# Patient Record
Sex: FEMALE | Race: WHITE | Employment: FULL TIME | URBAN - METROPOLITAN AREA
[De-identification: names, ages, dates, MRNs, and addresses within clinical notes are randomized per-mention and may not be internally consistent; named-entity substitution may affect disease eponyms.]

---

## 2017-02-24 DIAGNOSIS — F33.0 MAJOR DEPRESSIVE DISORDER, RECURRENT EPISODE, MILD (H): ICD-10-CM

## 2017-02-24 DIAGNOSIS — F90.0 ATTENTION DEFICIT HYPERACTIVITY DISORDER (ADHD), PREDOMINANTLY INATTENTIVE TYPE: ICD-10-CM

## 2017-02-24 RX ORDER — DEXTROAMPHETAMINE SACCHARATE, AMPHETAMINE ASPARTATE MONOHYDRATE, DEXTROAMPHETAMINE SULFATE AND AMPHETAMINE SULFATE 5; 5; 5; 5 MG/1; MG/1; MG/1; MG/1
20 CAPSULE, EXTENDED RELEASE ORAL DAILY
Qty: 30 CAPSULE | Refills: 0 | OUTPATIENT
Start: 2017-02-24

## 2017-02-24 RX ORDER — ESCITALOPRAM OXALATE 20 MG/1
20 TABLET ORAL DAILY
Qty: 90 TABLET | Refills: 0 | Status: SHIPPED | OUTPATIENT
Start: 2017-02-24 | End: 2017-03-06

## 2017-02-24 NOTE — TELEPHONE ENCOUNTER
..Reason for Call:  Medication or medication refill:    Do you use a [facility]?  Name of the pharmacy and phone number for the current request:  [facility] [phone]    Name of the medication requested: adderall, lexapro    Other request: just call when the script for the adderall is ready    Can we leave a detailed message on this number? YES    Phone number patient can be reached at: [phone] (D)      Best Time: anytime  Please delegate to another provider    Call taken on 2/24/2017 at 2:04 PM by Paula Portillo

## 2017-03-06 RX ORDER — ESCITALOPRAM OXALATE 20 MG/1
20 TABLET ORAL DAILY
Qty: 90 TABLET | Refills: 0 | Status: SHIPPED | OUTPATIENT
Start: 2017-03-06 | End: 2023-05-25

## 2017-03-06 RX ORDER — DEXTROAMPHETAMINE SACCHARATE, AMPHETAMINE ASPARTATE MONOHYDRATE, DEXTROAMPHETAMINE SULFATE AND AMPHETAMINE SULFATE 5; 5; 5; 5 MG/1; MG/1; MG/1; MG/1
20 CAPSULE, EXTENDED RELEASE ORAL DAILY
Qty: 30 CAPSULE | Refills: 0 | Status: SHIPPED | OUTPATIENT
Start: 2017-03-06 | End: 2023-05-25

## 2017-03-06 NOTE — TELEPHONE ENCOUNTER
Reason for Call:  Other prescription    Detailed comments: Pt returning phone call and would like for covering provider for Yadira Clyde to refill Rx's as soon as possible for Pt is leaving out of town this week.    Phone Number Patient can be reached at: Home number on file [phone] (home)    Best Time: Anytime    Can we leave a detailed message on this number? YES    Call taken on 3/6/2017 at 3:54 PM by Stefano Morales

## 2017-05-21 ENCOUNTER — TRANSFERRED RECORDS (OUTPATIENT)
Dept: HEALTH INFORMATION MANAGEMENT | Facility: CLINIC | Age: 52
End: 2017-05-21

## 2020-02-23 ENCOUNTER — HEALTH MAINTENANCE LETTER (OUTPATIENT)
Age: 55
End: 2020-02-23

## 2020-12-01 ENCOUNTER — OFFICE VISIT (OUTPATIENT)
Dept: DERMATOLOGY | Facility: CLINIC | Age: 55
End: 2020-12-01
Payer: COMMERCIAL

## 2020-12-01 DIAGNOSIS — D22.9 CONGENITAL MELANOCYTIC NEVUS: ICD-10-CM

## 2020-12-01 DIAGNOSIS — L82.0 INFLAMED SEBORRHEIC KERATOSIS: Primary | ICD-10-CM

## 2020-12-01 PROCEDURE — 99202 OFFICE O/P NEW SF 15 MIN: CPT | Mod: 25 | Performed by: DERMATOLOGY

## 2020-12-01 PROCEDURE — 17110 DESTRUCTION B9 LES UP TO 14: CPT | Performed by: DERMATOLOGY

## 2020-12-01 RX ORDER — LISDEXAMFETAMINE DIMESYLATE 40 MG/1
40 CAPSULE ORAL DAILY
COMMUNITY
Start: 2020-10-23

## 2020-12-01 ASSESSMENT — PAIN SCALES - GENERAL: PAINLEVEL: NO PAIN (0)

## 2020-12-01 NOTE — PROGRESS NOTES
[facility] Dermatology Note    Dermatology Problem List:  1. History of AKs  2. History of IPL treatments  3. Small melanocytic congenital nevus, left superior shoulder  4. ISK, left medial cheek: s/p cryo    Encounter Date: Dec 1, 2020    CC:  Chief Complaint   Patient presents with     Derm Problem     Lesion on left side of nose x6 months, has increased in size. Mole on left shoulder, checked by previous dermatologist and was not concerned.       History of Present Illness:  Ms. Adair Velez is a 55 year old female who presents for lesion of concern.    She is new to our department. She has seen a dermatologist prior. She has no history of skin cancer, atypical moles, or precancerous lesions to her knowledge.    Today, she notes concerns about a lesion on the left side of her nose. It has been there about 6 months. Increasing in size. Picked at it some. Hurts some. Has bled a bit when picked at. She also notes concerns about a birth bill on her left shoulder. No changes recently but would like re-evaluate to ensure still ok.    No other concerns addressed today.    Past Medical History:   Patient Active Problem List   Diagnosis     CARDIOVASCULAR SCREENING; LDL GOAL LESS THAN 160     Abnormal Pap smear     Anxiety     Mild major depression (H)     Attention deficit hyperactivity disorder (ADHD), predominantly inattentive type     Controlled substance agreement signed     Past Medical History:   Diagnosis Date     Abnormal Pap smear     + HPV, HGSIL     Degeneration of lumbar or lumbosacral intervertebral disc 2008     Depression with anxiety      MEDICAL HISTORY OF -     NEUROCARDIOGENIC ASYNCOPE     Past Surgical History:   Procedure Laterality Date     ABDOMINOPLASTY  14     ARTHROSCOPY KNEE  1993     BREAST SURGERY  14     C STOMACH SURGERY PROCEDURE UNLISTED        SECTION  2009     LEEP TX, CERVICAL      LEEP TX Cervical     TONSILLECTOMY      AGE [age]        Social History:  Patient reports that she has never smoked. She has never used smokeless tobacco. She reports current alcohol use. She reports that she does not use drugs. 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    Family History:  Negative for skin cancer.    Medications:  Current Outpatient Medications   Medication Sig Dispense Refill     busPIRone (BUSPAR) 5 MG tablet Start at 5 mg twice daily for 3 days, then 7.5 mg (1.5 tabs) twice daily for 3 days, then 10 mg (2 tabs) twice daily for 3 days, then 12.5 mg (2.5 tabs) twice daily for 3 days, then 15 mg (3 tabs) twice daily and stay at that dose 150 tablet 0     escitalopram (LEXAPRO) 20 MG tablet Take 1 tablet (20 mg) by mouth daily 90 tablet 0     TYLENOL 325 MG OR TABS 2 TABLETS EVERY 4 HOURS AS NEEDED       VYVANSE 40 MG capsule Take 40 mg by mouth daily       amphetamine-dextroamphetamine (ADDERALL XR) 20 MG per 24 hr capsule Take 1 capsule (20 mg) by mouth daily (Patient not taking: Reported on 12/1/2020) 30 capsule 0     amphetamine-dextroamphetamine (ADDERALL XR) 20 MG per capsule Take 1 capsule (20 mg) by mouth daily (Patient not taking: Reported on 12/1/2020) 30 capsule 0     amphetamine-dextroamphetamine (ADDERALL XR) 20 MG per capsule Take 1 capsule (20 mg) by mouth daily (Patient not taking: Reported on 12/1/2020) 30 capsule 0       Allergies   Allergen Reactions     No Known Drug Allergy        Review of Systems:  -Constitutional: Patient is otherwise feeling well, in usual state of health.   -Skin: As above in HPI. No additional skin concerns.    Physical exam:  Vitals: There were no vitals taken for this visit.  GEN: This is a well developed, well-nourished female in no acute distress, in a pleasant mood.    SKIN: Focused examination of the face (not under mask) and left shoulder was performed.  - Espinoza Type II  - ~4x2cm brown plaque with speckled appearance and terminal hairs on the left superior  shoulder  - Inflamed seborrheic keratosis on the left medial cheek  - No other lesions of concern on areas examined.     Impression/Plan:    1. Seborrheic keratosis, inflamed: reassured of benign nature. Clinically inflamed and symptomatic, so treatment warranted. Discussed cryo vs shave removal. Recommended cryo as has the potential to not scar. Cryo make take multiple treatments to resolve and may have hypopigmentation but scar is rare. Patient agreeable to this.  - Cryotherapy procedure note: After verbal consent and discussion of risks and benefits including but no limited to dyspigmentation/scar, blister, and pain, 1 was(were) treated with 1-2mm freeze border for 2 cycles with liquid nitrogen. Post cryotherapy instructions were provided.    2. Small congenital melanocytic nevus, no atypia: Reassured of benign appearance and only very low risk of malignant transformation.    Follow-up in 2021 for skin check, earlier for new or changing lesions.     Staff Involved:  Staff and scribe    Scribe Disclosure  I, Sabine García, am serving as a scribe to document services personally performed by Dr. Flor Mas MD, based on data collection and the provider's statements to me.    Provider Disclosure:   The documentation recorded by the scribe accurately reflects the services I personally performed and the decisions made by me.    Flor Mas MD    Department of Dermatology  [facility]: Phone: [phone], Fax:[phone]  [facility]: Phone: [phone], Fax: [phone]

## 2020-12-01 NOTE — PROGRESS NOTES
Adair Velez's goals for this visit include:   Chief Complaint   Patient presents with     Derm Problem     Lesion on left side of nose x6 months, has increased in size. Mole on left shoulder, checked by previous dermatologist and was not concerned.       She requests these members of her care team be copied on today's visit information: no    PCP: Bakari Jenkins    Referring Provider:  No referring provider defined for this encounter.    There were no vitals taken for this visit.    Do you need any medication refills at today's visit? No  Sabine García LPN

## 2020-12-01 NOTE — PATIENT INSTRUCTIONS
Cryotherapy    What is it?    Use of a very cold liquid, such as liquid nitrogen, to freeze and destroy abnormal skin cells that need to be removed    What should I expect?    Tenderness and redness    A small blister that might grow and fill with dark purple blood. There may be crusting.    More than one treatment may be needed if the lesions do not go away.    How do I care for the treated area?    Gently wash the area with your hands when bathing.    Use a thin layer of Vaseline to help with healing. You may use a Band-Aid.     The area should heal within 7-10 days and may leave behind a pink or lighter color.     Do not use an antibiotic or Neosporin ointment.     You may take acetaminophen (Tylenol) for pain.     Call your Doctor if you have:    Severe pain    Signs of infection (warmth, redness, cloudy yellow drainage, and or a bad smell)    Questions or concerns    Who should I call with questions?       [facility]: [phone]       [facility]: [phone]       For urgent needs outside of business hours call the [facility] at [phone]        and ask for the dermatology resident on call

## 2020-12-01 NOTE — LETTER
12/1/2020         RE: Adair Velez  [address]  [place] [address]        Dear Colleague,    Thank you for referring your patient, Adair Velez, to the [facility]. Please see a copy of my visit note below.    [facility] Dermatology Note    Dermatology Problem List:  1. History of AKs  2. History of IPL treatments  3. Small melanocytic congenital nevus, left superior shoulder  4. ISK, left medial cheek: s/p cryo    Encounter Date: Dec 1, 2020    CC:  Chief Complaint   Patient presents with     Derm Problem     Lesion on left side of nose x6 months, has increased in size. Mole on left shoulder, checked by previous dermatologist and was not concerned.       History of Present Illness:  Ms. Adair Velez is a 55 year old female who presents for lesion of concern.    She is new to our department. She has seen a dermatologist prior. She has no history of skin cancer, atypical moles, or precancerous lesions to her knowledge.    Today, she notes concerns about a lesion on the left side of her nose. It has been there about 6 months. Increasing in size. Picked at it some. Hurts some. Has bled a bit when picked at. She also notes concerns about a birth bill on her left shoulder. No changes recently but would like re-evaluate to ensure still ok.    No other concerns addressed today.    Past Medical History:   Patient Active Problem List   Diagnosis     CARDIOVASCULAR SCREENING; LDL GOAL LESS THAN 160     Abnormal Pap smear     Anxiety     Mild major depression (H)     Attention deficit hyperactivity disorder (ADHD), predominantly inattentive type     Controlled substance agreement signed     Past Medical History:   Diagnosis Date     Abnormal Pap smear 1999    + HPV, HGSIL     Degeneration of lumbar or lumbosacral intervertebral disc 4/23/2008     Depression with anxiety      MEDICAL HISTORY OF -     NEUROCARDIOGENIC ASYNCOPE     Past Surgical History:    Procedure Laterality Date     ABDOMINOPLASTY  14     ARTHROSCOPY KNEE  1993     BREAST SURGERY  14     C STOMACH SURGERY PROCEDURE UNLISTED        SECTION  2009     LEEP TX, CERVICAL      LEEP TX Cervical     TONSILLECTOMY      AGE 8-9       Social History:  Patient reports that she has never smoked. She has never used smokeless tobacco. She reports current alcohol use. She reports that she does not use drugs. 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    Family History:  Negative for skin cancer.    Medications:  Current Outpatient Medications   Medication Sig Dispense Refill     busPIRone (BUSPAR) 5 MG tablet Start at 5 mg twice daily for 3 days, then 7.5 mg (1.5 tabs) twice daily for 3 days, then 10 mg (2 tabs) twice daily for 3 days, then 12.5 mg (2.5 tabs) twice daily for 3 days, then 15 mg (3 tabs) twice daily and stay at that dose 150 tablet 0     escitalopram (LEXAPRO) 20 MG tablet Take 1 tablet (20 mg) by mouth daily 90 tablet 0     TYLENOL 325 MG OR TABS 2 TABLETS EVERY 4 HOURS AS NEEDED       VYVANSE 40 MG capsule Take 40 mg by mouth daily       amphetamine-dextroamphetamine (ADDERALL XR) 20 MG per 24 hr capsule Take 1 capsule (20 mg) by mouth daily (Patient not taking: Reported on 2020) 30 capsule 0     amphetamine-dextroamphetamine (ADDERALL XR) 20 MG per capsule Take 1 capsule (20 mg) by mouth daily (Patient not taking: Reported on 2020) 30 capsule 0     amphetamine-dextroamphetamine (ADDERALL XR) 20 MG per capsule Take 1 capsule (20 mg) by mouth daily (Patient not taking: Reported on 2020) 30 capsule 0       Allergies   Allergen Reactions     No Known Drug Allergy        Review of Systems:  -Constitutional: Patient is otherwise feeling well, in usual state of health.   -Skin: As above in HPI. No additional skin concerns.    Physical exam:  Vitals: There were no vitals taken for this visit.  GEN: This is a well developed,  well-nourished female in no acute distress, in a pleasant mood.    SKIN: Focused examination of the face (not under mask) and left shoulder was performed.  - Espinoza Type II  - ~4x2cm brown plaque with speckled appearance and terminal hairs on the left superior shoulder  - Inflamed seborrheic keratosis on the left medial cheek  - No other lesions of concern on areas examined.     Impression/Plan:    1. Seborrheic keratosis, inflamed: reassured of benign nature. Clinically inflamed and symptomatic, so treatment warranted. Discussed cryo vs shave removal. Recommended cryo as has the potential to not scar. Cryo make take multiple treatments to resolve and may have hypopigmentation but scar is rare. Patient agreeable to this.  - Cryotherapy procedure note: After verbal consent and discussion of risks and benefits including but no limited to dyspigmentation/scar, blister, and pain, 1 was(were) treated with 1-2mm freeze border for 2 cycles with liquid nitrogen. Post cryotherapy instructions were provided.    2. Small congenital melanocytic nevus, no atypia: Reassured of benign appearance and only very low risk of malignant transformation.    Follow-up in 2021 for skin check, earlier for new or changing lesions.     Staff Involved:  Staff and scribe    Scribe Disclosure  I, Sabine García, am serving as a scribe to document services personally performed by Dr. Flor Mas MD, based on data collection and the provider's statements to me.    Provider Disclosure:   The documentation recorded by the scribe accurately reflects the services I personally performed and the decisions made by me.    Flor Mas MD    Department of Dermatology  [facility]: Phone: [phone], Fax:[phone]  [facility]: Phone: [phone], Fax: [phone]          Adair Velez's goals for this visit include:    Chief Complaint   Patient presents with     Derm Problem     Lesion on left side of nose x6 months, has increased in size. Mole on left shoulder, checked by previous dermatologist and was not concerned.       She requests these members of her care team be copied on today's visit information: no    PCP: Bakari Jenkins    Referring Provider:  No referring provider defined for this encounter.    There were no vitals taken for this visit.    Do you need any medication refills at today's visit? No  Sabine García LPN          Again, thank you for allowing me to participate in the care of your patient.        Sincerely,        Flor Mas MD

## 2021-04-11 ENCOUNTER — HEALTH MAINTENANCE LETTER (OUTPATIENT)
Age: 56
End: 2021-04-11

## 2021-09-25 ENCOUNTER — HEALTH MAINTENANCE LETTER (OUTPATIENT)
Age: 56
End: 2021-09-25

## 2021-10-18 ENCOUNTER — E-VISIT (OUTPATIENT)
Dept: URGENT CARE | Facility: URGENT CARE | Age: 56
End: 2021-10-18
Payer: COMMERCIAL

## 2021-10-18 DIAGNOSIS — J01.90 ACUTE SINUSITIS, RECURRENCE NOT SPECIFIED, UNSPECIFIED LOCATION: Primary | ICD-10-CM

## 2021-10-18 PROCEDURE — 99421 OL DIG E/M SVC 5-10 MIN: CPT | Performed by: FAMILY MEDICINE

## 2021-10-18 RX ORDER — FLUTICASONE PROPIONATE 50 MCG
1 SPRAY, SUSPENSION (ML) NASAL DAILY
Qty: 18.2 ML | Refills: 0 | Status: SHIPPED | OUTPATIENT
Start: 2021-10-18

## 2021-10-18 NOTE — PATIENT INSTRUCTIONS
Dear Adair Velez    After reviewing your responses, I've been able to diagnose you with?a sinus infection caused by a virus.?     Based on your responses and diagnosis, I have prescribed Flonase to treat your symptoms. I have sent this to your pharmacy.?     It is also important to stay well hydrated, get lots of rest and take over-the-counter decongestants,?tylenol?or ibuprofen if you?are able to?take those medications per your primary care provider to help relieve discomfort.?     It is important that you take?all of?your prescribed medication even if your symptoms are improving after a few doses.? Taking?all of?your medicine helps prevent the symptoms from returning.?     If your symptoms worsen, you develop severe headache, vomiting, high fever (>102), or are not improving in 7 days, please contact your primary care provider for an appointment or visit any of our convenient Walk-in Care or Urgent Care Centers to be seen which can be found on our website?here.?     Thanks again for choosing?us?as your health care partner,?   ?  Bakari Carrasco, DO?

## 2022-05-07 ENCOUNTER — HEALTH MAINTENANCE LETTER (OUTPATIENT)
Age: 57
End: 2022-05-07

## 2022-05-13 ENCOUNTER — OFFICE VISIT (OUTPATIENT)
Dept: URGENT CARE | Facility: URGENT CARE | Age: 57
End: 2022-05-13
Payer: COMMERCIAL

## 2022-05-13 VITALS
OXYGEN SATURATION: 99 % | SYSTOLIC BLOOD PRESSURE: 126 MMHG | DIASTOLIC BLOOD PRESSURE: 78 MMHG | HEART RATE: 80 BPM | WEIGHT: 155 LBS | TEMPERATURE: 99 F | RESPIRATION RATE: 16 BRPM | BODY MASS INDEX: 24.83 KG/M2

## 2022-05-13 DIAGNOSIS — R07.0 THROAT PAIN: ICD-10-CM

## 2022-05-13 DIAGNOSIS — Z20.822 SUSPECTED 2019 NOVEL CORONAVIRUS INFECTION: ICD-10-CM

## 2022-05-13 DIAGNOSIS — J01.90 ACUTE SINUSITIS WITH SYMPTOMS > 10 DAYS: Primary | ICD-10-CM

## 2022-05-13 LAB — DEPRECATED S PYO AG THROAT QL EIA: NEGATIVE

## 2022-05-13 PROCEDURE — 87651 STREP A DNA AMP PROBE: CPT | Performed by: PHYSICIAN ASSISTANT

## 2022-05-13 PROCEDURE — U0003 INFECTIOUS AGENT DETECTION BY NUCLEIC ACID (DNA OR RNA); SEVERE ACUTE RESPIRATORY SYNDROME CORONAVIRUS 2 (SARS-COV-2) (CORONAVIRUS DISEASE [COVID-19]), AMPLIFIED PROBE TECHNIQUE, MAKING USE OF HIGH THROUGHPUT TECHNOLOGIES AS DESCRIBED BY CMS-2020-01-R: HCPCS | Performed by: PHYSICIAN ASSISTANT

## 2022-05-13 PROCEDURE — U0005 INFEC AGEN DETEC AMPLI PROBE: HCPCS | Performed by: PHYSICIAN ASSISTANT

## 2022-05-13 PROCEDURE — 99213 OFFICE O/P EST LOW 20 MIN: CPT | Mod: CS | Performed by: PHYSICIAN ASSISTANT

## 2022-05-13 RX ORDER — SERTRALINE HYDROCHLORIDE 100 MG/1
200 TABLET, FILM COATED ORAL DAILY
COMMUNITY
Start: 2022-04-22

## 2022-05-13 ASSESSMENT — ENCOUNTER SYMPTOMS
MYALGIAS: 0
JOINT SWELLING: 0
NAUSEA: 0
ALLERGIC/IMMUNOLOGIC NEGATIVE: 1
VOMITING: 0
FEVER: 1
CARDIOVASCULAR NEGATIVE: 1
WOUND: 0
DIARRHEA: 0
SINUS PAIN: 1
EYES NEGATIVE: 1
RHINORRHEA: 0
WEAKNESS: 0
COUGH: 1
ARTHRALGIAS: 0
PALPITATIONS: 0
SHORTNESS OF BREATH: 0
LIGHT-HEADEDNESS: 0
ENDOCRINE NEGATIVE: 1
HEADACHES: 0
SINUS PRESSURE: 1
MUSCULOSKELETAL NEGATIVE: 1
DIZZINESS: 0
NECK PAIN: 0
NECK STIFFNESS: 0
CHILLS: 0
BACK PAIN: 0
SORE THROAT: 1

## 2022-05-13 NOTE — PROGRESS NOTES
Chief Complaint:     Chief Complaint   Patient presents with     Pharyngitis     Started yesterday with sore throat and coughing with sinus congestion and headache.        Results for orders placed or performed in visit on 05/13/22   Streptococcus A Rapid Screen w/Reflex to PCR     Status: Normal    Specimen: Throat; Swab   Result Value Ref Range    Group A Strep antigen Negative Negative       Medical Decision Making:    Vital signs reviewed by Mike Chavez PA-C  /78 (BP Location: Right arm, Patient Position: Sitting, Cuff Size: Adult Regular)   Pulse 80   Temp 99  F (37.2  C)   Resp 16   Wt 70.3 kg (155 lb)   SpO2 99%   BMI 24.83 kg/m      Differential Diagnosis:  URI Adult/Peds:  Bronchitis-viral, Influenza, Pneumonia, Sinusitis, Strep pharyngitis, Tonsilitis, Viral pharyngitis, Viral syndrome and Viral upper respiratory illness        ASSESSMENT    1. Acute sinusitis with symptoms > 10 days    2. Throat pain    3. Suspected 2019 novel coronavirus infection        PLAN    Patient is in no acute distress.    Temp is 99 in clinic today, lung sounds were clear, and O2 sats at 99% on RA.    RST was negative.  We will call with PCR results only if positive.  COVID swab collected in clinic today.  With length of symptoms, Rx for Augmentin sent in for sinus infection.  Rest, Push fluids, vaporizer, elevation of head of bed.  Ibuprofen and or Tylenol for any fever or body aches.  Over the counter cough suppressant- PRN- as discussed.   If symptoms worsen, recheck immediately otherwise follow up with your PCP in 1 week if symptoms are not improving.  Worrisome symptoms discussed with instructions to go to the ED.  Patient verbalized understanding and agreed with this plan.    Labs:    Results for orders placed or performed in visit on 05/13/22   Streptococcus A Rapid Screen w/Reflex to PCR     Status: Normal    Specimen: Throat; Swab   Result Value Ref Range    Group A Strep antigen Negative Negative         Vital signs reviewed by Mike Chavez PA-C  /78 (BP Location: Right arm, Patient Position: Sitting, Cuff Size: Adult Regular)   Pulse 80   Temp 99  F (37.2  C)   Resp 16   Wt 70.3 kg (155 lb)   SpO2 99%   BMI 24.83 kg/m      Current Meds      Current Outpatient Medications:      amoxicillin-clavulanate (AUGMENTIN) 875-125 MG tablet, Take 1 tablet by mouth 2 times daily for 10 days, Disp: 20 tablet, Rfl: 0     fluticasone (FLONASE) 50 MCG/ACT nasal spray, Spray 1 spray into both nostrils daily, Disp: 18.2 mL, Rfl: 0     sertraline (ZOLOFT) 100 MG tablet, Take 200 mg by mouth daily, Disp: , Rfl:      TYLENOL 325 MG OR TABS, 2 TABLETS EVERY 4 HOURS AS NEEDED, Disp: , Rfl:      VYVANSE 40 MG capsule, Take 40 mg by mouth daily, Disp: , Rfl:      amphetamine-dextroamphetamine (ADDERALL XR) 20 MG per 24 hr capsule, Take 1 capsule (20 mg) by mouth daily (Patient not taking: Reported on 12/1/2020), Disp: 30 capsule, Rfl: 0     amphetamine-dextroamphetamine (ADDERALL XR) 20 MG per capsule, Take 1 capsule (20 mg) by mouth daily (Patient not taking: Reported on 12/1/2020), Disp: 30 capsule, Rfl: 0     amphetamine-dextroamphetamine (ADDERALL XR) 20 MG per capsule, Take 1 capsule (20 mg) by mouth daily (Patient not taking: Reported on 12/1/2020), Disp: 30 capsule, Rfl: 0     busPIRone (BUSPAR) 5 MG tablet, Start at 5 mg twice daily for 3 days, then 7.5 mg (1.5 tabs) twice daily for 3 days, then 10 mg (2 tabs) twice daily for 3 days, then 12.5 mg (2.5 tabs) twice daily for 3 days, then 15 mg (3 tabs) twice daily and stay at that dose, Disp: 150 tablet, Rfl: 0     escitalopram (LEXAPRO) 20 MG tablet, Take 1 tablet (20 mg) by mouth daily, Disp: 90 tablet, Rfl: 0      Respiratory History    occasional episodes of bronchitis      SUBJECTIVE    HPI: Adair Velez is an 56 year old female who presents with chest congestion, cough nonproductive, occasional, fever, nasal congestion, nasal discharge clear and sore  throat.  Symptoms began 10  days ago and has worsened.  There is no shortness of breath, wheezing and chest pain.  Patient is eating and drinking well.  No nausea, vomiting, or diarrhea.    Patient denies any recent travel or exposure to known COVID positive tested individual.      ROS:     Review of Systems   Constitutional: Positive for fever. Negative for chills.   HENT: Positive for congestion, sinus pressure, sinus pain and sore throat. Negative for ear pain and rhinorrhea.    Eyes: Negative.    Respiratory: Positive for cough. Negative for shortness of breath.    Cardiovascular: Negative.  Negative for chest pain and palpitations.   Gastrointestinal: Negative for diarrhea, nausea and vomiting.   Endocrine: Negative.    Genitourinary: Negative.    Musculoskeletal: Negative.  Negative for arthralgias, back pain, joint swelling, myalgias, neck pain and neck stiffness.   Skin: Negative.  Negative for rash and wound.   Allergic/Immunologic: Negative.  Negative for immunocompromised state.   Neurological: Negative for dizziness, weakness, light-headedness and headaches.         Family History   Family History   Problem Relation Age of Onset     Cancer - colorectal Mother      Heart Disease Mother      Prostate Cancer Father         Problem history  Patient Active Problem List   Diagnosis     CARDIOVASCULAR SCREENING; LDL GOAL LESS THAN 160     Abnormal Pap smear     Anxiety     Mild major depression (H)     Attention deficit hyperactivity disorder (ADHD), predominantly inattentive type     Controlled substance agreement signed        Allergies  Allergies   Allergen Reactions     No Known Drug Allergy         Social History  Social History     Socioeconomic History     Marital status:      Spouse name: Not on file     Number of children: Not on file     Years of education: Not on file     Highest education level: Not on file   Occupational History     Not on file   Tobacco Use     Smoking status: Never Smoker      Smokeless tobacco: Never Used   Substance and Sexual Activity     Alcohol use: Yes     Comment: socially     Drug use: No     Sexual activity: Never   Other Topics Concern     Parent/sibling w/ CABG, MI or angioplasty before 65F 55M? Not Asked      Service No     Blood Transfusions No     Caffeine Concern No     Occupational Exposure No     Hobby Hazards No     Sleep Concern No     Stress Concern No     Weight Concern Yes     Special Diet No     Back Care No     Exercise No     Bike Helmet No     Seat Belt Yes     Self-Exams No   Social History Narrative     Not on file     Social Determinants of Health     Financial Resource Strain: Not on file   Food Insecurity: Not on file   Transportation Needs: Not on file   Physical Activity: Not on file   Stress: Not on file   Social Connections: Not on file   Intimate Partner Violence: Not on file   Housing Stability: Not on file        OBJECTIVE     Vital signs reviewed by Mike Chavez PA-C  /78 (BP Location: Right arm, Patient Position: Sitting, Cuff Size: Adult Regular)   Pulse 80   Temp 99  F (37.2  C)   Resp 16   Wt 70.3 kg (155 lb)   SpO2 99%   BMI 24.83 kg/m       Physical Exam  Vitals and nursing note reviewed.   Constitutional:       General: She is not in acute distress.     Appearance: She is well-developed. She is not ill-appearing, toxic-appearing or diaphoretic.   HENT:      Head: Normocephalic and atraumatic.      Right Ear: Hearing, tympanic membrane, ear canal and external ear normal. Tympanic membrane is not perforated, erythematous, retracted or bulging.      Left Ear: Hearing, tympanic membrane, ear canal and external ear normal. Tympanic membrane is not perforated, erythematous, retracted or bulging.      Nose: Congestion present. No mucosal edema or rhinorrhea.      Right Sinus: Maxillary sinus tenderness present. No frontal sinus tenderness.      Left Sinus: Maxillary sinus tenderness present. No frontal sinus tenderness.       Mouth/Throat:      Pharynx: Posterior oropharyngeal erythema present. No pharyngeal swelling, oropharyngeal exudate or uvula swelling.      Tonsils: No tonsillar exudate or tonsillar abscesses. 0 on the right. 0 on the left.   Eyes:      General:         Right eye: No discharge.         Left eye: No discharge.      Pupils: Pupils are equal, round, and reactive to light.   Cardiovascular:      Rate and Rhythm: Normal rate and regular rhythm.      Heart sounds: Normal heart sounds. No murmur heard.    No friction rub. No gallop.   Pulmonary:      Effort: Pulmonary effort is normal. No respiratory distress.      Breath sounds: Normal breath sounds. No decreased breath sounds, wheezing, rhonchi or rales.   Chest:      Chest wall: No tenderness.   Abdominal:      General: Bowel sounds are normal. There is no distension.      Palpations: Abdomen is soft. There is no mass.      Tenderness: There is no abdominal tenderness. There is no guarding.   Musculoskeletal:      Cervical back: Normal range of motion and neck supple.   Lymphadenopathy:      Head:      Right side of head: No submental, submandibular, tonsillar, preauricular or posterior auricular adenopathy.      Left side of head: No submental, submandibular, tonsillar, preauricular or posterior auricular adenopathy.      Cervical: No cervical adenopathy.      Right cervical: No superficial or posterior cervical adenopathy.     Left cervical: No superficial or posterior cervical adenopathy.   Skin:     General: Skin is warm and dry.      Findings: No rash.   Neurological:      Mental Status: She is alert and oriented to person, place, and time.      Cranial Nerves: No cranial nerve deficit.      Deep Tendon Reflexes: Reflexes are normal and symmetric.   Psychiatric:         Behavior: Behavior normal. Behavior is cooperative.         Thought Content: Thought content normal.         Judgment: Judgment normal.           Mike Chavez PA-C  5/13/2022, 6:04 PM

## 2022-05-14 LAB
GROUP A STREP BY PCR: NOT DETECTED
SARS-COV-2 RNA RESP QL NAA+PROBE: NEGATIVE

## 2022-12-22 ENCOUNTER — LAB REQUISITION (OUTPATIENT)
Dept: LAB | Facility: CLINIC | Age: 57
End: 2022-12-22

## 2022-12-22 DIAGNOSIS — Z13.220 ENCOUNTER FOR SCREENING FOR LIPOID DISORDERS: ICD-10-CM

## 2022-12-22 DIAGNOSIS — Z01.419 ENCOUNTER FOR GYNECOLOGICAL EXAMINATION (GENERAL) (ROUTINE) WITHOUT ABNORMAL FINDINGS: ICD-10-CM

## 2022-12-22 DIAGNOSIS — Z13.29 ENCOUNTER FOR SCREENING FOR OTHER SUSPECTED ENDOCRINE DISORDER: ICD-10-CM

## 2022-12-22 DIAGNOSIS — Z13.1 ENCOUNTER FOR SCREENING FOR DIABETES MELLITUS: ICD-10-CM

## 2022-12-22 LAB
CHOLEST SERPL-MCNC: 251 MG/DL
FASTING STATUS PATIENT QL REPORTED: NORMAL
GLUCOSE SERPL-MCNC: 94 MG/DL (ref 70–99)
HBA1C MFR BLD: 5.3 %
HDLC SERPL-MCNC: 76 MG/DL
LDLC SERPL CALC-MCNC: 161 MG/DL
NONHDLC SERPL-MCNC: 175 MG/DL
TRIGL SERPL-MCNC: 71 MG/DL
TSH SERPL DL<=0.005 MIU/L-ACNC: 1.35 UIU/ML (ref 0.3–4.2)

## 2022-12-22 PROCEDURE — 82947 ASSAY GLUCOSE BLOOD QUANT: CPT | Performed by: OBSTETRICS & GYNECOLOGY

## 2022-12-22 PROCEDURE — 80061 LIPID PANEL: CPT | Performed by: OBSTETRICS & GYNECOLOGY

## 2022-12-22 PROCEDURE — 87624 HPV HI-RISK TYP POOLED RSLT: CPT | Performed by: OBSTETRICS & GYNECOLOGY

## 2022-12-22 PROCEDURE — 83036 HEMOGLOBIN GLYCOSYLATED A1C: CPT | Performed by: OBSTETRICS & GYNECOLOGY

## 2022-12-22 PROCEDURE — 84443 ASSAY THYROID STIM HORMONE: CPT | Performed by: OBSTETRICS & GYNECOLOGY

## 2022-12-22 PROCEDURE — G0123 SCREEN CERV/VAG THIN LAYER: HCPCS | Performed by: OBSTETRICS & GYNECOLOGY

## 2022-12-27 LAB
BKR LAB AP GYN ADEQUACY: NORMAL
BKR LAB AP GYN INTERPRETATION: NORMAL
BKR LAB AP HPV REFLEX: NORMAL
BKR LAB AP LMP: NORMAL
BKR LAB AP PREVIOUS ABNL DX: NORMAL
BKR LAB AP PREVIOUS ABNORMAL: NORMAL
PATH REPORT.COMMENTS IMP SPEC: NORMAL
PATH REPORT.COMMENTS IMP SPEC: NORMAL
PATH REPORT.RELEVANT HX SPEC: NORMAL

## 2022-12-29 LAB
HUMAN PAPILLOMA VIRUS 16 DNA: NEGATIVE
HUMAN PAPILLOMA VIRUS 18 DNA: NEGATIVE
HUMAN PAPILLOMA VIRUS FINAL DIAGNOSIS: NORMAL
HUMAN PAPILLOMA VIRUS OTHER HR: NEGATIVE

## 2023-04-22 ENCOUNTER — HEALTH MAINTENANCE LETTER (OUTPATIENT)
Age: 58
End: 2023-04-22

## 2023-05-23 NOTE — PROGRESS NOTES
[facility] Dermatology Note  Encounter Date: May 25, 2023  Office Visit     Dermatology Problem List:  Last skin check: 5/25/2023  1. History of AKs  2. History of IPL treatments  3. Small melanocytic congenital nevus, left superior shoulder  4. ISK, left medial cheek: s/p cryo  5. 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 Resolved upon exam today.     #Androgenetic alopecia. Pt reports thinning of the hair postmenopausal. Recommended topical treatment including Rogaine. No evidence of TE  -Start Rogaine. -start minoxidil 5% foam or solution once daily. Keep away from face, counseled on risk of irritation and hair growth on face, risk of temporary shedfing. Keep product away from face. Stop if you become pregnant. Handout provided  -declines hair labs    # Green nail. Discussed etiology of condition.   -Recommend soaking finger BID with vinegar and water   -Start topical cipro 1-2 drops BID.   -Photographs obtained.   -Go to ER or urgent care for signs of infection including increasing pain, increasing redness, discharge such as pus, fevers, chills or if not feeling well.   -stop nail polish    # Solar lentigines on the sun exposed skin areas. Benign nature was discussed. No further intervention required at this time.    -tretinoin initiated, reviewed AVS details on use  -recommend fraxel X2 and then clear and brilliant maintenance. Okay for valtrex, reviewed BMP      Procedures Performed:   None.     Follow-up: 3 weeks for nail on the photos and phone    Staff and Scribe:     Scribe Disclosure:   I, Sharon Scott, am serving as a scribe to document services personally performed by this physician, Dr. Na Zhang, based on data collection and the provider's statements to me.     Provider Disclosure:   The documentation recorded by the  ann accurately reflects the services I personally performed and the decisions made by me.    Na Zhang MD    Department of Dermatology  [facility]: Phone: [phone], Fax:[phone]  [facility]: Phone: [phone], Fax: [phone]    ____________________________________________    CC: Skin Check (Patient here for a skin check, areas of concern is a mole that she gets checked all the time. Also nail fungus on fingernails)    HPI:  Ms. Adair Velez is a(n) 57 year old female who presents today as a return patient for a skin check. Pt reports concerns with hair thinning.       Last seen 12/1/2020 by Dr. Mas for a spot check. At that time, an ISK was treated with cryo.     Patient is otherwise feeling well, without additional skin concerns.    Labs Reviewed:  N/A    Physical Exam:  Vitals: There were no vitals taken for this visit.  SKIN: Total skin excluding the undergarment areas was performed. The exam included the head/face, neck, both arms, chest, back, abdomen, both legs, digits and/or nails.   - 4x2cm brown plaque with speckled appearance on left superior shoulder.   - Hair thinning of the crown.   -Negative hair pull test.   -Nails painted.   - Scattered brown macules on sun exposed areas.  - No other lesions of concern on areas examined.     Medications:  Current Outpatient Medications   Medication     amphetamine-dextroamphetamine (ADDERALL XR) 20 MG per 24 hr capsule     amphetamine-dextroamphetamine (ADDERALL XR) 20 MG per capsule     amphetamine-dextroamphetamine (ADDERALL XR) 20 MG per capsule     busPIRone (BUSPAR) 5 MG tablet     escitalopram (LEXAPRO) 20 MG tablet     fluticasone (FLONASE) 50 MCG/ACT nasal spray     sertraline (ZOLOFT) 100 MG tablet     TYLENOL 325 MG OR TABS     VYVANSE 40 MG capsule     No current facility-administered medications for this  visit.      Past Medical History:   Patient Active Problem List   Diagnosis     CARDIOVASCULAR SCREENING; LDL GOAL LESS THAN 160     Abnormal Pap smear     Anxiety     Mild major depression (H)     Attention deficit hyperactivity disorder (ADHD), predominantly inattentive type     Controlled substance agreement signed     Past Medical History:   Diagnosis Date     Abnormal Pap smear 1999    + HPV, HGSIL     Degeneration of lumbar or lumbosacral intervertebral disc 4/23/2008     Depression with anxiety      MEDICAL HISTORY OF -     NEUROCARDIOGENIC ASYNCOPE        CC Referred Self, MD  No address on file on close of this encounter.

## 2023-05-25 ENCOUNTER — OFFICE VISIT (OUTPATIENT)
Dept: DERMATOLOGY | Facility: CLINIC | Age: 58
End: 2023-05-25
Payer: COMMERCIAL

## 2023-05-25 DIAGNOSIS — L60.8 GREEN NAILS: Primary | ICD-10-CM

## 2023-05-25 DIAGNOSIS — L81.4 SOLAR LENTIGINOSIS: ICD-10-CM

## 2023-05-25 DIAGNOSIS — L57.8 PHOTOAGING OF SKIN: ICD-10-CM

## 2023-05-25 DIAGNOSIS — L64.9 ANDROGENIC ALOPECIA: ICD-10-CM

## 2023-05-25 PROCEDURE — 99214 OFFICE O/P EST MOD 30 MIN: CPT | Performed by: DERMATOLOGY

## 2023-05-25 RX ORDER — TRETINOIN 0.25 MG/G
CREAM TOPICAL
Qty: 45 G | Refills: 3 | Status: SHIPPED | OUTPATIENT
Start: 2023-05-25 | End: 2023-05-30

## 2023-05-25 RX ORDER — CIPROFLOXACIN HYDROCHLORIDE 3.5 MG/ML
SOLUTION/ DROPS TOPICAL
Qty: 10 ML | Refills: 1 | Status: SHIPPED | OUTPATIENT
Start: 2023-05-25 | End: 2023-05-30

## 2023-05-25 NOTE — LETTER
5/25/2023         RE: Adair Velez  [address]  [place] [address]        Dear Colleague,    Thank you for referring your patient, Adair Velez, to the [facility]. Please see a copy of my visit note below.    [facility] Dermatology Note  Encounter Date: May 25, 2023  Office Visit     Dermatology Problem List:  Last skin check: 5/25/2023  1. History of AKs  2. History of IPL treatments  3. Small melanocytic congenital nevus, left superior shoulder  4. ISK, left medial cheek: s/p cryo  5. 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 Resolved upon exam today.     #Androgenetic alopecia. Pt reports thinning of the hair postmenopausal. Recommended topical treatment including Rogaine. No evidence of TE  -Start Rogaine. -start minoxidil 5% foam or solution once daily. Keep away from face, counseled on risk of irritation and hair growth on face, risk of temporary shedfing. Keep product away from face. Stop if you become pregnant. Handout provided  -declines hair labs    # Green nail. Discussed etiology of condition.   -Recommend soaking finger BID with vinegar and water   -Start topical cipro 1-2 drops BID.   -Photographs obtained.   -Go to ER or urgent care for signs of infection including increasing pain, increasing redness, discharge such as pus, fevers, chills or if not feeling well.   -stop nail polish    # Solar lentigines on the sun exposed skin areas. Benign nature was discussed. No further intervention required at this time.    -tretinoin initiated, reviewed AVS details on use  -recommend fraxel X2 and then clear and brilliant maintenance. Okay for valtrex, reviewed BMP      Procedures Performed:   None.     Follow-up: 3 weeks for nail on the photos and phone    Staff and Scribe:     Scribe  Disclosure:   I, Sharon Tyler, am serving as a scribe to document services personally performed by this physician, Dr. Na Zhang, based on data collection and the provider's statements to me.     Provider Disclosure:   The documentation recorded by the scribe accurately reflects the services I personally performed and the decisions made by me.    Na Zhang MD    Department of Dermatology  [facility]: Phone: [phone], Fax:[phone]  [facility]: Phone: [phone], Fax: [phone]    ____________________________________________    CC: Skin Check (Patient here for a skin check, areas of concern is a mole that she gets checked all the time. Also nail fungus on fingernails)    HPI:  Ms. Adair Velez is a(n) 57 year old female who presents today as a return patient for a skin check. Pt reports concerns with hair thinning.       Last seen 12/1/2020 by Dr. Mas for a spot check. At that time, an ISK was treated with cryo.     Patient is otherwise feeling well, without additional skin concerns.    Labs Reviewed:  N/A    Physical Exam:  Vitals: There were no vitals taken for this visit.  SKIN: Total skin excluding the undergarment areas was performed. The exam included the head/face, neck, both arms, chest, back, abdomen, both legs, digits and/or nails.   - 4x2cm brown plaque with speckled appearance on left superior shoulder.   - Hair thinning of the crown.   -Negative hair pull test.   -Nails painted.   - Scattered brown macules on sun exposed areas.  - No other lesions of concern on areas examined.     Medications:  Current Outpatient Medications   Medication     amphetamine-dextroamphetamine (ADDERALL XR) 20 MG per 24 hr capsule     amphetamine-dextroamphetamine (ADDERALL XR) 20 MG per capsule     amphetamine-dextroamphetamine (ADDERALL XR) 20 MG per capsule     busPIRone  (BUSPAR) 5 MG tablet     escitalopram (LEXAPRO) 20 MG tablet     fluticasone (FLONASE) 50 MCG/ACT nasal spray     sertraline (ZOLOFT) 100 MG tablet     TYLENOL 325 MG OR TABS     VYVANSE 40 MG capsule     No current facility-administered medications for this visit.      Past Medical History:   Patient Active Problem List   Diagnosis     CARDIOVASCULAR SCREENING; LDL GOAL LESS THAN 160     Abnormal Pap smear     Anxiety     Mild major depression (H)     Attention deficit hyperactivity disorder (ADHD), predominantly inattentive type     Controlled substance agreement signed     Past Medical History:   Diagnosis Date     Abnormal Pap smear 1999    + HPV, HGSIL     Degeneration of lumbar or lumbosacral intervertebral disc 4/23/2008     Depression with anxiety      MEDICAL HISTORY OF -     NEUROCARDIOGENIC ASYNCOPE        CC Referred Self, MD  No address on file on close of this encounter.     Again, thank you for allowing me to participate in the care of your patient.        Sincerely,        Na Zhang MD

## 2023-05-25 NOTE — NURSING NOTE
Adair Velez's goals for this visit include:   Chief Complaint   Patient presents with     Skin Check     Patient here for a skin check, areas of concern is a mole that she gets checked all the time. Also nail fungus on fingernails       She requests these members of her care team be copied on today's visit information:     PCP: Bakari Jenkins    Referring Provider:  Referred Self, MD  No address on file    There were no vitals taken for this visit.    Do you need any medication refills at today's visit? Evelyne SINGH

## 2023-05-25 NOTE — PATIENT INSTRUCTIONS
Go to ER or urgent care for signs of infection including increasing pain, increasing redness, discharge such as pus, fevers, chills or if not feeling well.      1/4% Topical Acetic Acid Solution and Soaks          What is acetic acid solution and what is it used for?  1/4% acetic acid is a vinegar mixture that can be made at home.  It is used to help keep wounds clean and stop infection.    How do I make the solution?  Clean a quart size or larger jar and lid with hot soapy water. Rinse jar well.   The jar and lid can also go in the . Use the hottest and longest cycle.   Add 3 tablespoons plus 1 teaspoon household white vinegar to 4 cups (1 quart) of water and mix well. White vinegar is 5% acetic acid.   Store the acetic acid mixture in the jar and keep it refrigerated.   Make sure the solution is room temperature before using it.     How do I do an acetic acid soak?  Pour acetic acid solution onto gauze or a clean wash cloth. Wring it out gently.  Put on the affected area.  Leave on the affected area for 15-20 minutes and then remove. The gauze/cloth should be slightly damp when you remove it.   You might need to do some more cleaning of the area after the soak. Your doctor will tell you if you need to do this.   Rinse the area briefly with fresh clean water.   Put on medications and dressings to the area as ordered by your doctor.  Throw away unused solution after 24 hours. Make a new bottle each day.     Who should I call with questions?  [facility]: [phone]  [facility]: [phone]  For urgent needs outside of business hours call the [facility] at [phone] and ask for the dermatology resident on call     Topical Retinoids    What are topical retinoids?  These are medicines that are related to Vitamin A. They are used on the skin.  Retin-A , Renova , Differin , and Tazorac  are brand names.  Come in creams  and clear gels  Used to treat skin conditions like pimples (acne), face wrinkling, or dark-colored sunspots    How do I use these medicines?  Wash face and let dry for 15 to 30 minutes.  Use a large pea-size amount of medicine to cover the whole face. Do not put on close to the eyes and lips. Rub in gently.   Start by using every other day. If you have no irritation after a few days, start to use it daily.   You might have too much irritation with daily use. Use it less often until the irritation goes away. Then try to increase slowly to daily use.   Irritation improves over time.  You may use moisturizer if your skin becomes dry. Look for  non-comedogenic  (non-pore plugging) and oil free products.     What are the side effects?  Dryness   Peeling and flaking   Irritation of the skin   Possible increased chance of sunburns. Protect your skin from sunlight. Wear a hat and use a sunscreen with SPF 30 or higher. Your sunscreen should have both UVA and UVB (broad-spectrum) protection.    Who should I call with questions?  [facility]: [phone]   [facility]: [phone]  For urgent needs outside of business hours call the [facility] at [phone] and ask for the dermatology resident on call     Topical Rogaine (Minoxidil) for Pattern Hair Loss    Minoxidil is an FDA approved over the counter topical for the treatment of hair loss and thinning hair in men and women.   Initially a 2% solution was available however this required application twice daily. A 5% solution is also now approved, only requiring application once per day.     Available Products:   Rogaine 5% solution: Packaged for men however can be used by men or women. Use dropper and apply directly to scalp at bedtime. This product can cause an allergy because of presence of propylene glycol. Stop this product if you develop a rash or itching and contact your physician.   Rogaine 5%  foam: Packaged for men and women: Apply foam directly to the scalp once daily. This is less greasy compared to the solution. This formula is preferred for those who had a reaction to the solution product. If you develop rash or itching, stop the product and contact your physician.     What if I stop minoxidil topical?   After stopping minoxidil the hair will return to the usual pattern of thinning. Using the product 3-4 times per week is better than not using product at all.       Can I use generic minoxidil?   Yes, look for 5% minoxidil.     What are the side effects?  The most common side effect is rash or itching of the scalp. This can occur if a contact allergy develops with propylene glycol. A small group of patients noticed the appearance of facial hair if the product runs onto the face or with prolonged use. Keep it away from the face.  This can cause temporary shedding. Please, call us if this happens.  This can irritated the scalp. Please, call us if this happens.  Stop this product if you become pregnant or are breastfeeding      Last updated: 11/2/2021

## 2023-05-27 ENCOUNTER — OFFICE VISIT (OUTPATIENT)
Dept: URGENT CARE | Facility: URGENT CARE | Age: 58
End: 2023-05-27
Payer: COMMERCIAL

## 2023-05-27 VITALS
WEIGHT: 169.6 LBS | TEMPERATURE: 97.1 F | OXYGEN SATURATION: 97 % | DIASTOLIC BLOOD PRESSURE: 74 MMHG | SYSTOLIC BLOOD PRESSURE: 117 MMHG | HEART RATE: 82 BPM | RESPIRATION RATE: 20 BRPM | BODY MASS INDEX: 27.17 KG/M2

## 2023-05-27 DIAGNOSIS — H65.191 OTHER NON-RECURRENT ACUTE NONSUPPURATIVE OTITIS MEDIA OF RIGHT EAR: Primary | ICD-10-CM

## 2023-05-27 DIAGNOSIS — H69.91 DYSFUNCTION OF RIGHT EUSTACHIAN TUBE: ICD-10-CM

## 2023-05-27 PROCEDURE — 99213 OFFICE O/P EST LOW 20 MIN: CPT | Performed by: EMERGENCY MEDICINE

## 2023-05-27 RX ORDER — AMOXICILLIN 875 MG
875 TABLET ORAL 2 TIMES DAILY
Qty: 14 TABLET | Refills: 0 | Status: SHIPPED | OUTPATIENT
Start: 2023-05-27 | End: 2023-06-03

## 2023-05-27 RX ORDER — FLUTICASONE PROPIONATE 50 MCG
1 SPRAY, SUSPENSION (ML) NASAL DAILY
Qty: 11.1 ML | Refills: 0 | Status: SHIPPED | OUTPATIENT
Start: 2023-05-27

## 2023-05-27 NOTE — PROGRESS NOTES
Assessment & Plan     Diagnosis:  (H65.191) Other non-recurrent acute nonsuppurative otitis media of right ear  (primary encounter diagnosis)  Plan: amoxicillin (AMOXIL) 875 MG tablet    (H69.81) Dysfunction of right eustachian tube  Plan: fluticasone (FLONASE) 50 MCG/ACT nasal spray      Medical Decision Making:  Adair Velez is a 57 year old female who presents for evaluation of otalgia.  The patient has an exam consistent with acute otitis media on the right. Patient did have some cold symptoms last week but these have resolved. Viral testing declined. Given no fever and likely eustachian tube dysfunction.  Will therefore do watchful waiting antibiotics while we ensure good pain control, flonase, zyrtec for allergies as well.  There is no sign of mastoiditis, meningitis, perforation, mass, dental abscess, or peritonsillar abscess. There is no evidence of otitis externa.  The patient will be started on antibiotics in 24-48 hours if fever, chills or increased pain develop and may take Tylenol or Ibuprofen for pain.  Return if increasing pain, fever, decrease in hearing or ear discharge that persists.  Follow-up with primary physician in 7-10 days, if symptoms persist. The patient voices understanding and agreement with the plan.     Martínez Levy PA-C  [facility] U[facility]ENT [facility]    Subjective     Adair Velez is a 57 year old female who presents to clinic today for the following health issues:  Chief Complaint   Patient presents with     Otalgia     Right;  x4 days        HPI    Patient states for the last 4 to 5 days has been experiencing right ear pain, has been getting progressively worse, does fluctuate in intensity however.  Note that she was on a plane ride last week and shortly after that is when she started noticing the pain, she also had a cold with some cough and runny nose last week as well.  She also notes a muffled sensation in her right ear as well, no complete hearing loss.  He has not had any  fevers, discharge from the ears, sore throat or difficulty swallowing or breathing or other concerns.    Review of Systems    See HPI    Objective      Vitals: /74   Pulse 82   Temp 97.1  F (36.2  C) (Tympanic)   Resp 20   Wt 76.9 kg (169 lb 9.6 oz)   SpO2 97%   BMI 27.17 kg/m        Patient Vitals for the past 24 hrs:   BP Temp Temp src Pulse Resp SpO2 Weight   05/27/23 1134 117/74 97.1  F (36.2  C) Tympanic 82 20 97 % 76.9 kg (169 lb 9.6 oz)       Vital signs reviewed by: Martínez Levy PA-C    Physical Exam   Constitutional: Alert and active. No acute distress.  HENT: Ears: Right TM is erythematous and bulging. Left TM is normal. No perforation. Bilateral external ear canals and auricles are normal. No tenderness with manipulation of the pinnae and tragus. No mastoid tenderness bilaterally.  Nose: Nasal turbinates are swollen, right greater than left.  Clear rhinorrhea noted.  No septal mass or epistaxis.  Mouth: Normal tongue and tonsil. Posterior oropharynx is clear. Uvula is midline.  Cardiovascular: Regular rate and rhythm  Pulmonary/Chest: Effort normal. No respiratory distress. Lungs clear to auscultation bilaterally.  Skin: No rash noted on visualized skin or face.      Martínez Levy PA-C, May 27, 2023

## 2023-05-30 ENCOUNTER — TELEPHONE (OUTPATIENT)
Dept: DERMATOLOGY | Facility: CLINIC | Age: 58
End: 2023-05-30

## 2023-05-30 ENCOUNTER — MYC MEDICAL ADVICE (OUTPATIENT)
Dept: DERMATOLOGY | Facility: CLINIC | Age: 58
End: 2023-05-30
Payer: COMMERCIAL

## 2023-05-30 DIAGNOSIS — L57.8 PHOTOAGING OF SKIN: ICD-10-CM

## 2023-05-30 DIAGNOSIS — L60.8 GREEN NAILS: ICD-10-CM

## 2023-05-30 RX ORDER — TRETINOIN 0.25 MG/G
CREAM TOPICAL
Qty: 45 G | Refills: 3 | Status: SHIPPED | OUTPATIENT
Start: 2023-05-30

## 2023-05-30 RX ORDER — CIPROFLOXACIN HYDROCHLORIDE 3.5 MG/ML
SOLUTION/ DROPS TOPICAL
Qty: 10 ML | Refills: 1 | Status: SHIPPED | OUTPATIENT
Start: 2023-05-30 | End: 2023-06-26

## 2023-05-30 NOTE — TELEPHONE ENCOUNTER
PRIOR AUTHORIZATION DENIED    Medication: TRETINOIN 0.025 % EX CREA  Insurance Company: [employer] [place] - Phone [phone] Fax [phone]  Denial Date: 5/27/2023  Denial Rational:     Appeal Information:     Patient Notified: No

## 2023-06-02 ENCOUNTER — HEALTH MAINTENANCE LETTER (OUTPATIENT)
Age: 58
End: 2023-06-02

## 2023-06-26 ENCOUNTER — VIRTUAL VISIT (OUTPATIENT)
Dept: DERMATOLOGY | Facility: CLINIC | Age: 58
End: 2023-06-26
Payer: COMMERCIAL

## 2023-06-26 DIAGNOSIS — L64.9 ANDROGENETIC ALOPECIA: Primary | ICD-10-CM

## 2023-06-26 DIAGNOSIS — L60.8 GREEN NAILS: ICD-10-CM

## 2023-06-26 PROCEDURE — 99214 OFFICE O/P EST MOD 30 MIN: CPT | Mod: 93 | Performed by: DERMATOLOGY

## 2023-06-26 RX ORDER — CIPROFLOXACIN HYDROCHLORIDE 3.5 MG/ML
SOLUTION/ DROPS TOPICAL
Qty: 10 ML | Refills: 1 | Status: SHIPPED | OUTPATIENT
Start: 2023-06-26

## 2023-06-26 NOTE — PATIENT INSTRUCTIONS
[facility] Dermatology Visit    Thank you for allowing us to participate in your care. Your findings, instructions and follow-up plan are as follows:     Go to ER or urgent care for signs of infection including increasing pain, increasing redness, discharge such as pus, fevers, chills or if not feeling well.       When should I call my doctor?  If you are worsening or not improving, please, contact us or seek urgent care as noted below.     Who should I call with questions (adults)?  [facility] (adult and pediatric): [phone]  [facility] (adult): [phone]  For urgent needs outside of business hours call the [facility] at [phone] and ask for the dermatology resident on call  If this is a medical emergency and you are unable to reach an ER, Call [phone]    Who should I call with questions (pediatric)?  [facility]- Pediatric Dermatology  Dr. Yola Ramirez, Dr. Cesia Funes, Dr. Irina Diaz, Argentina Grimes, LEONCIO Das, Dr. Tameka Gillespie & Dr. Ru Freire  Non Urgent  Nurse Triage Line; [phone]- Tri and Allyn RN Care Coordinators   Zandra (/Complex ) [phone]    If you need a prescription refill, please contact your pharmacy. Refills are approved or denied by our physicians during normal business hours, Monday through Fridays  Per office policy, refills will not be granted if you have not been seen within the past year (or sooner depending on your child's condition).    Scheduling Information:  Pediatric Appointment Scheduling and Call Center [phone]  Radiology Scheduling- [phone]  Sedation Unit Scheduling- [phone]  [place] Scheduling- General [phone]; Pediatric Dermatology [phone]  Main  Services: [phone]  Irish: [phone]  Jamaican: [phone]  Hmong/Tajik/Icelandic:  [phone]  Preadmission Nursing Department Fax Number: [phone] (fax all pre-operative paperwork to this number)    For urgent matters arising during evenings, weekends, or holidays that cannot wait for normal business hours please call [phone] and ask for the dermatology resident on call to be paged.

## 2023-06-26 NOTE — PROGRESS NOTES
[facility] Dermatology Note  Encounter Date: Jun 26, 2023   Store-and-Forward and Telephone [phone]  . Location of teledermatologist: [facility].  Start time: 2:21pm End time: 2:27pm.    Dermatology Problem List:  Last skin check: 5/25/2023  1. History of AKs  2. History of IPL treatments  3. Small melanocytic congenital nevus, left superior shoulder  4. ISK, left medial cheek: s/p cryo  5. Cosmetics  -IPL  -tretinoin 0.025% started 5/25/2019  -fraxel in person: -recommend fraxel X2 and then clear and brilliant maintenance. Okay for valtrex, reviewed BMP     ____________________________________________     Assessment & Plan:  #Androgenetic alopecia. Pt reports thinning of the hair postmenopausal. Recommended topical treatment including Rogaine. declines  -declines hair loss topicals. Including minoxidil at this time      # Green nail. improved on cipro  -Soaking finger BID with vinegar and water   -cipro drop bid.          Procedures Performed:    None    Follow-up: 3 months, call earlier if not resolving.     Staff:     Na Zhang MD    Department of Dermatology  [facility]: Phone: [phone], Fax:[phone]  [facility]: Phone: [phone], Fax: [phone]      ____________________________________________    CC: RECHECK    HPI:  Ms. Adair Velez is a(n) 57 year old female who presents today as a return patient for nail issues    Nail is better    Did not start rogaine, she is not sure if she wants to use it    Patient is otherwise feeling well, without additional skin concerns.    Labs Reviewed:   N/A    Physical Exam:  Vitals: There were no vitals taken for this visit.  SKIN: Teledermatology photos were reviewed; image quality and interpretability:  acceptable. Image date: today.  - yellow discoloration, nail bed with possible  lifting of the nail  - No other lesions of concern on areas examined.     Medications:  Current Outpatient Medications   Medication     ciprofloxacin (CILOXAN) 0.3 % ophthalmic solution     fluticasone (FLONASE) 50 MCG/ACT nasal spray     fluticasone (FLONASE) 50 MCG/ACT nasal spray     sertraline (ZOLOFT) 100 MG tablet     tretinoin (RETIN-A) 0.025 % external cream     TYLENOL 325 MG OR TABS     VYVANSE 40 MG capsule     No current facility-administered medications for this visit.      Past Medical/Surgical History:   Patient Active Problem List   Diagnosis     CARDIOVASCULAR SCREENING; LDL GOAL LESS THAN 160     Abnormal Pap smear     Anxiety     Mild major depression (H)     Attention deficit hyperactivity disorder (ADHD), predominantly inattentive type     Controlled substance agreement signed     Past Medical History:   Diagnosis Date     Abnormal Pap smear 1999    + HPV, HGSIL     Degeneration of lumbar or lumbosacral intervertebral disc 4/23/2008     Depression with anxiety      MEDICAL HISTORY OF -     NEUROCARDIOGENIC ASYNCOPE       CC No referring provider defined for this encounter. on close of this encounter.

## 2023-06-26 NOTE — NURSING NOTE
Teledermatology Nurse Call Patients:     Are you in the [facility] at the time of the encounter? yes    Today's visit will be billed to you and your insurance.    A teledermatology visit is not as thorough as an in-person visit and the quality of the photograph sent may not be of the same quality as that taken by the dermatology clinic.    Chief Complaint   Patient presents with     FILI Winkler

## 2023-06-26 NOTE — LETTER
6/26/2023         RE: Adair Velez  [address]  [place] [address]        Dear Colleague,    Thank you for referring your patient, Adair Velez, to the [facility]. Please see a copy of my visit note below.    [facility] Dermatology Note  Encounter Date: Jun 26, 2023   Store-and-Forward and Telephone [phone]  . Location of teledermatologist: [facility].  Start time: 2:21pm End time: 2:27pm.    Dermatology Problem List:  Last skin check: 5/25/2023  1. History of AKs  2. History of IPL treatments  3. Small melanocytic congenital nevus, left superior shoulder  4. ISK, left medial cheek: s/p cryo  5. Cosmetics  -IPL  -tretinoin 0.025% started 5/25/2019  -fraxel in person: -recommend fraxel X2 and then clear and brilliant maintenance. Okay for valtrex, reviewed BMP     ____________________________________________     Assessment & Plan:  #Androgenetic alopecia. Pt reports thinning of the hair postmenopausal. Recommended topical treatment including Rogaine. declines  -declines hair loss topicals. Including minoxidil at this time      # Green nail. improved on cipro  -Soaking finger BID with vinegar and water   -cipro drop bid.          Procedures Performed:    None    Follow-up: 3 months, call earlier if not resolving.     Staff:     Na Zhang MD    Department of Dermatology  [facility]: Phone: [phone], Fax:[phone]  [facility]: Phone: [phone], Fax: [phone]      ____________________________________________    CC: RECHECK    HPI:  Ms. Adair Velez is a(n) 57 year old female who presents today as a return patient for nail issues    Nail is better    Did not start rogaine, she is not sure if she wants to use it    Patient is otherwise feeling well, without additional skin  concerns.    Labs Reviewed:   N/A    Physical Exam:  Vitals: There were no vitals taken for this visit.  SKIN: Teledermatology photos were reviewed; image quality and interpretability:  acceptable. Image date: today.  - yellow discoloration, nail bed with possible lifting of the nail  - No other lesions of concern on areas examined.     Medications:  Current Outpatient Medications   Medication     ciprofloxacin (CILOXAN) 0.3 % ophthalmic solution     fluticasone (FLONASE) 50 MCG/ACT nasal spray     fluticasone (FLONASE) 50 MCG/ACT nasal spray     sertraline (ZOLOFT) 100 MG tablet     tretinoin (RETIN-A) 0.025 % external cream     TYLENOL 325 MG OR TABS     VYVANSE 40 MG capsule     No current facility-administered medications for this visit.      Past Medical/Surgical History:   Patient Active Problem List   Diagnosis     CARDIOVASCULAR SCREENING; LDL GOAL LESS THAN 160     Abnormal Pap smear     Anxiety     Mild major depression (H)     Attention deficit hyperactivity disorder (ADHD), predominantly inattentive type     Controlled substance agreement signed     Past Medical History:   Diagnosis Date     Abnormal Pap smear 1999    + HPV, HGSIL     Degeneration of lumbar or lumbosacral intervertebral disc 4/23/2008     Depression with anxiety      MEDICAL HISTORY OF -     NEUROCARDIOGENIC ASYNCOPE       CC No referring provider defined for this encounter. on close of this encounter.    Again, thank you for allowing me to participate in the care of your patient.        Sincerely,        Na Zhang MD

## 2024-02-10 ENCOUNTER — HEALTH MAINTENANCE LETTER (OUTPATIENT)
Age: 59
End: 2024-02-10

## 2024-04-20 ENCOUNTER — HEALTH MAINTENANCE LETTER (OUTPATIENT)
Age: 59
End: 2024-04-20

## 2024-12-10 DIAGNOSIS — L60.8 GREEN NAILS: ICD-10-CM

## 2024-12-10 RX ORDER — CIPROFLOXACIN HYDROCHLORIDE 3.5 MG/ML
SOLUTION/ DROPS TOPICAL
Qty: 10 ML | Refills: 1 | OUTPATIENT
Start: 2024-12-10

## 2024-12-10 NOTE — TELEPHONE ENCOUNTER
CIPROFLOXACIN 0.3% EYE DROP          Will file in chart as: ciprofloxacin (CILOXAN) 0.3 % ophthalmic solution    Sig: Apply 1-2 drop twice daily to the nail for 3 weeks        Last Written Prescription Date:  6/26/23  Last Fill Quantity: 10 ml ,   # refills: 1  Last Office Visit : 6/26/23     Refill denied per protocol.Appt needed for refills.

## 2025-03-08 ENCOUNTER — HEALTH MAINTENANCE LETTER (OUTPATIENT)
Age: 60
End: 2025-03-08

## 2025-05-11 ENCOUNTER — HEALTH MAINTENANCE LETTER (OUTPATIENT)
Age: 60
End: 2025-05-11

## 2025-07-31 ENCOUNTER — LAB REQUISITION (OUTPATIENT)
Dept: LAB | Facility: CLINIC | Age: 60
End: 2025-07-31

## 2025-07-31 DIAGNOSIS — Z13.1 ENCOUNTER FOR SCREENING FOR DIABETES MELLITUS: ICD-10-CM

## 2025-07-31 DIAGNOSIS — Z12.4 ENCOUNTER FOR SCREENING FOR MALIGNANT NEOPLASM OF CERVIX: ICD-10-CM

## 2025-07-31 DIAGNOSIS — Z13.220 ENCOUNTER FOR SCREENING FOR LIPOID DISORDERS: ICD-10-CM

## 2025-07-31 DIAGNOSIS — Z13.29 ENCOUNTER FOR SCREENING FOR OTHER SUSPECTED ENDOCRINE DISORDER: ICD-10-CM

## 2025-07-31 LAB
CHOLEST SERPL-MCNC: 236 MG/DL
EST. AVERAGE GLUCOSE BLD GHB EST-MCNC: 100 MG/DL
FASTING STATUS PATIENT QL REPORTED: NO
HBA1C MFR BLD: 5.1 %
HDLC SERPL-MCNC: 65 MG/DL
LDLC SERPL CALC-MCNC: 154 MG/DL
NONHDLC SERPL-MCNC: 171 MG/DL
T4 FREE SERPL-MCNC: 1.11 NG/DL (ref 0.9–1.7)
TRIGL SERPL-MCNC: 83 MG/DL
TSH SERPL DL<=0.005 MIU/L-ACNC: 1.51 UIU/ML (ref 0.3–4.2)

## 2025-07-31 PROCEDURE — 83036 HEMOGLOBIN GLYCOSYLATED A1C: CPT | Performed by: OBSTETRICS & GYNECOLOGY

## 2025-07-31 PROCEDURE — 84443 ASSAY THYROID STIM HORMONE: CPT | Performed by: OBSTETRICS & GYNECOLOGY

## 2025-07-31 PROCEDURE — 82465 ASSAY BLD/SERUM CHOLESTEROL: CPT | Performed by: OBSTETRICS & GYNECOLOGY

## 2025-07-31 PROCEDURE — 84439 ASSAY OF FREE THYROXINE: CPT | Performed by: OBSTETRICS & GYNECOLOGY

## 2025-08-06 LAB
BKR AP ASSOCIATED HPV REPORT: NORMAL
BKR LAB AP GYN ADEQUACY: NORMAL
BKR LAB AP GYN INTERPRETATION: NORMAL
BKR LAB AP LMP: NORMAL
BKR LAB AP PREVIOUS ABNL DX: NORMAL
BKR LAB AP PREVIOUS ABNORMAL: NORMAL
PATH REPORT.COMMENTS IMP SPEC: NORMAL
PATH REPORT.COMMENTS IMP SPEC: NORMAL
PATH REPORT.RELEVANT HX SPEC: NORMAL